# Patient Record
Sex: FEMALE | Race: WHITE | NOT HISPANIC OR LATINO | Employment: OTHER | URBAN - METROPOLITAN AREA
[De-identification: names, ages, dates, MRNs, and addresses within clinical notes are randomized per-mention and may not be internally consistent; named-entity substitution may affect disease eponyms.]

---

## 2021-06-16 PROBLEM — R55 SYNCOPE AND COLLAPSE: Status: ACTIVE | Noted: 2019-11-09

## 2023-04-08 ENCOUNTER — APPOINTMENT (OUTPATIENT)
Dept: CT IMAGING | Facility: CLINIC | Age: 78
End: 2023-04-08
Attending: EMERGENCY MEDICINE
Payer: COMMERCIAL

## 2023-04-08 ENCOUNTER — HOSPITAL ENCOUNTER (OUTPATIENT)
Facility: CLINIC | Age: 78
Setting detail: OBSERVATION
Discharge: HOME | End: 2023-04-09
Attending: EMERGENCY MEDICINE | Admitting: EMERGENCY MEDICINE
Payer: COMMERCIAL

## 2023-04-08 DIAGNOSIS — S09.90XA TRAUMATIC INJURY OF HEAD, INITIAL ENCOUNTER: ICD-10-CM

## 2023-04-08 DIAGNOSIS — R55 SYNCOPE, UNSPECIFIED SYNCOPE TYPE: ICD-10-CM

## 2023-04-08 DIAGNOSIS — K52.9 COLITIS: ICD-10-CM

## 2023-04-08 DIAGNOSIS — R10.32 ABDOMINAL PAIN, LEFT LOWER QUADRANT: ICD-10-CM

## 2023-04-08 LAB
ALBUMIN SERPL-MCNC: 3.6 G/DL (ref 3.5–5)
ALP SERPL-CCNC: 83 U/L (ref 45–120)
ALT SERPL W P-5'-P-CCNC: 18 U/L (ref 0–45)
ANION GAP SERPL CALCULATED.3IONS-SCNC: 10 MMOL/L (ref 5–18)
AST SERPL W P-5'-P-CCNC: 24 U/L (ref 0–40)
ATRIAL RATE - MUSE: 78 BPM
BILIRUB DIRECT SERPL-MCNC: 0.1 MG/DL
BILIRUB SERPL-MCNC: 0.5 MG/DL (ref 0–1)
BUN SERPL-MCNC: 16 MG/DL (ref 8–28)
CALCIUM SERPL-MCNC: 9.1 MG/DL (ref 8.5–10.5)
CHLORIDE BLD-SCNC: 102 MMOL/L (ref 98–107)
CO2 SERPL-SCNC: 24 MMOL/L (ref 22–31)
CREAT SERPL-MCNC: 0.83 MG/DL (ref 0.6–1.1)
DIASTOLIC BLOOD PRESSURE - MUSE: 98 MMHG
ERYTHROCYTE [DISTWIDTH] IN BLOOD BY AUTOMATED COUNT: 12.4 % (ref 10–15)
GFR SERPL CREATININE-BSD FRML MDRD: 71 ML/MIN/1.73M2
GLUCOSE BLD-MCNC: 113 MG/DL (ref 70–125)
HCT VFR BLD AUTO: 38.9 % (ref 35–47)
HGB BLD-MCNC: 13.3 G/DL (ref 11.7–15.7)
INTERPRETATION ECG - MUSE: NORMAL
MCH RBC QN AUTO: 31.8 PG (ref 26.5–33)
MCHC RBC AUTO-ENTMCNC: 34.2 G/DL (ref 31.5–36.5)
MCV RBC AUTO: 93 FL (ref 78–100)
P AXIS - MUSE: 59 DEGREES
PLATELET # BLD AUTO: 241 10E3/UL (ref 150–450)
POTASSIUM BLD-SCNC: 4.2 MMOL/L (ref 3.5–5)
PR INTERVAL - MUSE: 160 MS
PROT SERPL-MCNC: 7.8 G/DL (ref 6–8)
QRS DURATION - MUSE: 100 MS
QT - MUSE: 410 MS
QTC - MUSE: 467 MS
R AXIS - MUSE: -24 DEGREES
RBC # BLD AUTO: 4.18 10E6/UL (ref 3.8–5.2)
SODIUM SERPL-SCNC: 136 MMOL/L (ref 136–145)
SYSTOLIC BLOOD PRESSURE - MUSE: 209 MMHG
T AXIS - MUSE: 35 DEGREES
TROPONIN I SERPL-MCNC: 0.01 NG/ML (ref 0–0.29)
VENTRICULAR RATE- MUSE: 78 BPM
WBC # BLD AUTO: 10.7 10E3/UL (ref 4–11)

## 2023-04-08 PROCEDURE — 96360 HYDRATION IV INFUSION INIT: CPT | Mod: 59

## 2023-04-08 PROCEDURE — 99285 EMERGENCY DEPT VISIT HI MDM: CPT | Mod: 25

## 2023-04-08 PROCEDURE — 258N000003 HC RX IP 258 OP 636: Performed by: EMERGENCY MEDICINE

## 2023-04-08 PROCEDURE — 83880 ASSAY OF NATRIURETIC PEPTIDE: CPT | Performed by: INTERNAL MEDICINE

## 2023-04-08 PROCEDURE — 80053 COMPREHEN METABOLIC PANEL: CPT | Performed by: EMERGENCY MEDICINE

## 2023-04-08 PROCEDURE — 84484 ASSAY OF TROPONIN QUANT: CPT | Performed by: EMERGENCY MEDICINE

## 2023-04-08 PROCEDURE — 70450 CT HEAD/BRAIN W/O DYE: CPT

## 2023-04-08 PROCEDURE — 93005 ELECTROCARDIOGRAM TRACING: CPT | Performed by: EMERGENCY MEDICINE

## 2023-04-08 PROCEDURE — 36415 COLL VENOUS BLD VENIPUNCTURE: CPT | Performed by: EMERGENCY MEDICINE

## 2023-04-08 PROCEDURE — 96361 HYDRATE IV INFUSION ADD-ON: CPT

## 2023-04-08 PROCEDURE — 99238 HOSP IP/OBS DSCHRG MGMT 30/<: CPT | Performed by: EMERGENCY MEDICINE

## 2023-04-08 PROCEDURE — 83735 ASSAY OF MAGNESIUM: CPT | Performed by: INTERNAL MEDICINE

## 2023-04-08 PROCEDURE — 82248 BILIRUBIN DIRECT: CPT | Performed by: EMERGENCY MEDICINE

## 2023-04-08 PROCEDURE — 74177 CT ABD & PELVIS W/CONTRAST: CPT

## 2023-04-08 PROCEDURE — 85027 COMPLETE CBC AUTOMATED: CPT | Performed by: EMERGENCY MEDICINE

## 2023-04-08 PROCEDURE — 250N000011 HC RX IP 250 OP 636: Performed by: EMERGENCY MEDICINE

## 2023-04-08 PROCEDURE — 51702 INSERT TEMP BLADDER CATH: CPT

## 2023-04-08 RX ORDER — IOPAMIDOL 755 MG/ML
100 INJECTION, SOLUTION INTRAVASCULAR ONCE
Status: COMPLETED | OUTPATIENT
Start: 2023-04-08 | End: 2023-04-08

## 2023-04-08 RX ADMIN — SODIUM CHLORIDE 1000 ML: 9 INJECTION, SOLUTION INTRAVENOUS at 21:35

## 2023-04-08 RX ADMIN — IOPAMIDOL 100 ML: 755 INJECTION, SOLUTION INTRAVENOUS at 22:27

## 2023-04-08 ASSESSMENT — ACTIVITIES OF DAILY LIVING (ADL)
ADLS_ACUITY_SCORE: 35
ADLS_ACUITY_SCORE: 35

## 2023-04-09 VITALS
SYSTOLIC BLOOD PRESSURE: 131 MMHG | TEMPERATURE: 98 F | BODY MASS INDEX: 30.85 KG/M2 | OXYGEN SATURATION: 98 % | WEIGHT: 215.5 LBS | RESPIRATION RATE: 18 BRPM | DIASTOLIC BLOOD PRESSURE: 62 MMHG | HEIGHT: 70 IN | HEART RATE: 75 BPM

## 2023-04-09 PROBLEM — S09.90XA TRAUMATIC INJURY OF HEAD, INITIAL ENCOUNTER: Status: ACTIVE | Noted: 2023-04-09

## 2023-04-09 PROBLEM — K52.9 COLITIS: Status: ACTIVE | Noted: 2023-04-09

## 2023-04-09 PROBLEM — R55 SYNCOPE, UNSPECIFIED SYNCOPE TYPE: Status: ACTIVE | Noted: 2023-04-09

## 2023-04-09 PROBLEM — R10.32 ABDOMINAL PAIN, LEFT LOWER QUADRANT: Status: ACTIVE | Noted: 2023-04-09

## 2023-04-09 LAB
ALBUMIN SERPL-MCNC: 3.2 G/DL (ref 3.5–5)
ALBUMIN UR-MCNC: NEGATIVE MG/DL
ALP SERPL-CCNC: 72 U/L (ref 45–120)
ALT SERPL W P-5'-P-CCNC: 14 U/L (ref 0–45)
ANION GAP SERPL CALCULATED.3IONS-SCNC: 9 MMOL/L (ref 5–18)
APPEARANCE UR: CLEAR
AST SERPL W P-5'-P-CCNC: 21 U/L (ref 0–40)
BASOPHILS # BLD AUTO: 0.1 10E3/UL (ref 0–0.2)
BASOPHILS NFR BLD AUTO: 1 %
BILIRUB SERPL-MCNC: 0.6 MG/DL (ref 0–1)
BILIRUB UR QL STRIP: NEGATIVE
BNP SERPL-MCNC: 14 PG/ML (ref 0–151)
BUN SERPL-MCNC: 14 MG/DL (ref 8–28)
CALCIUM SERPL-MCNC: 8.5 MG/DL (ref 8.5–10.5)
CHLORIDE BLD-SCNC: 108 MMOL/L (ref 98–107)
CO2 SERPL-SCNC: 22 MMOL/L (ref 22–31)
COLOR UR AUTO: COLORLESS
CREAT SERPL-MCNC: 0.75 MG/DL (ref 0.6–1.1)
EOSINOPHIL # BLD AUTO: 0.1 10E3/UL (ref 0–0.7)
EOSINOPHIL NFR BLD AUTO: 1 %
ERYTHROCYTE [DISTWIDTH] IN BLOOD BY AUTOMATED COUNT: 12.5 % (ref 10–15)
GFR SERPL CREATININE-BSD FRML MDRD: 81 ML/MIN/1.73M2
GLUCOSE BLD-MCNC: 109 MG/DL (ref 70–125)
GLUCOSE UR STRIP-MCNC: NEGATIVE MG/DL
HCT VFR BLD AUTO: 36.9 % (ref 35–47)
HGB BLD-MCNC: 12.5 G/DL (ref 11.7–15.7)
HGB UR QL STRIP: NEGATIVE
IMM GRANULOCYTES # BLD: 0 10E3/UL
IMM GRANULOCYTES NFR BLD: 0 %
KETONES UR STRIP-MCNC: ABNORMAL MG/DL
LEUKOCYTE ESTERASE UR QL STRIP: NEGATIVE
LYMPHOCYTES # BLD AUTO: 1.6 10E3/UL (ref 0.8–5.3)
LYMPHOCYTES NFR BLD AUTO: 22 %
MAGNESIUM SERPL-MCNC: 2.1 MG/DL (ref 1.8–2.6)
MAGNESIUM SERPL-MCNC: 2.1 MG/DL (ref 1.8–2.6)
MCH RBC QN AUTO: 31.7 PG (ref 26.5–33)
MCHC RBC AUTO-ENTMCNC: 33.9 G/DL (ref 31.5–36.5)
MCV RBC AUTO: 94 FL (ref 78–100)
MONOCYTES # BLD AUTO: 0.6 10E3/UL (ref 0–1.3)
MONOCYTES NFR BLD AUTO: 9 %
NEUTROPHILS # BLD AUTO: 4.5 10E3/UL (ref 1.6–8.3)
NEUTROPHILS NFR BLD AUTO: 67 %
NITRATE UR QL: NEGATIVE
NRBC # BLD AUTO: 0 10E3/UL
NRBC BLD AUTO-RTO: 0 /100
PH UR STRIP: 8 [PH] (ref 5–7)
PLATELET # BLD AUTO: 224 10E3/UL (ref 150–450)
POTASSIUM BLD-SCNC: 3.8 MMOL/L (ref 3.5–5)
PROCALCITONIN SERPL-MCNC: 0.03 NG/ML (ref 0–0.49)
PROT SERPL-MCNC: 6.8 G/DL (ref 6–8)
RBC # BLD AUTO: 3.94 10E6/UL (ref 3.8–5.2)
RBC URINE: 0 /HPF
SODIUM SERPL-SCNC: 139 MMOL/L (ref 136–145)
SP GR UR STRIP: 1.02 (ref 1–1.03)
UROBILINOGEN UR STRIP-MCNC: <2 MG/DL
WBC # BLD AUTO: 6.9 10E3/UL (ref 4–11)
WBC URINE: 3 /HPF

## 2023-04-09 PROCEDURE — G0378 HOSPITAL OBSERVATION PER HR: HCPCS

## 2023-04-09 PROCEDURE — 99223 1ST HOSP IP/OBS HIGH 75: CPT | Performed by: INTERNAL MEDICINE

## 2023-04-09 PROCEDURE — 250N000013 HC RX MED GY IP 250 OP 250 PS 637: Performed by: INTERNAL MEDICINE

## 2023-04-09 PROCEDURE — 80053 COMPREHEN METABOLIC PANEL: CPT | Performed by: INTERNAL MEDICINE

## 2023-04-09 PROCEDURE — 258N000003 HC RX IP 258 OP 636: Performed by: INTERNAL MEDICINE

## 2023-04-09 PROCEDURE — 83735 ASSAY OF MAGNESIUM: CPT | Performed by: INTERNAL MEDICINE

## 2023-04-09 PROCEDURE — 36415 COLL VENOUS BLD VENIPUNCTURE: CPT | Performed by: INTERNAL MEDICINE

## 2023-04-09 PROCEDURE — 81001 URINALYSIS AUTO W/SCOPE: CPT | Performed by: EMERGENCY MEDICINE

## 2023-04-09 PROCEDURE — 85025 COMPLETE CBC W/AUTO DIFF WBC: CPT | Performed by: INTERNAL MEDICINE

## 2023-04-09 PROCEDURE — 84145 PROCALCITONIN (PCT): CPT | Performed by: INTERNAL MEDICINE

## 2023-04-09 PROCEDURE — 96361 HYDRATE IV INFUSION ADD-ON: CPT

## 2023-04-09 RX ORDER — ESCITALOPRAM OXALATE 10 MG/1
10 TABLET ORAL DAILY
COMMUNITY

## 2023-04-09 RX ORDER — ACETAMINOPHEN 325 MG/1
650 TABLET ORAL EVERY 4 HOURS PRN
Status: DISCONTINUED | OUTPATIENT
Start: 2023-04-09 | End: 2023-04-09 | Stop reason: HOSPADM

## 2023-04-09 RX ORDER — ONDANSETRON 2 MG/ML
4 INJECTION INTRAMUSCULAR; INTRAVENOUS EVERY 6 HOURS PRN
Status: DISCONTINUED | OUTPATIENT
Start: 2023-04-09 | End: 2023-04-09 | Stop reason: HOSPADM

## 2023-04-09 RX ORDER — POTASSIUM CHLORIDE 1500 MG/1
20 TABLET, EXTENDED RELEASE ORAL ONCE
Status: COMPLETED | OUTPATIENT
Start: 2023-04-09 | End: 2023-04-09

## 2023-04-09 RX ORDER — ACETAMINOPHEN 650 MG/1
650 SUPPOSITORY RECTAL EVERY 6 HOURS PRN
Status: DISCONTINUED | OUTPATIENT
Start: 2023-04-09 | End: 2023-04-09 | Stop reason: HOSPADM

## 2023-04-09 RX ORDER — ONDANSETRON 4 MG/1
4 TABLET, ORALLY DISINTEGRATING ORAL EVERY 6 HOURS PRN
Status: DISCONTINUED | OUTPATIENT
Start: 2023-04-09 | End: 2023-04-09 | Stop reason: HOSPADM

## 2023-04-09 RX ORDER — SODIUM CHLORIDE 9 MG/ML
INJECTION, SOLUTION INTRAVENOUS CONTINUOUS
Status: DISCONTINUED | OUTPATIENT
Start: 2023-04-09 | End: 2023-04-09 | Stop reason: HOSPADM

## 2023-04-09 RX ADMIN — POTASSIUM CHLORIDE 20 MEQ: 1500 TABLET, EXTENDED RELEASE ORAL at 06:34

## 2023-04-09 RX ADMIN — SODIUM CHLORIDE: 9 INJECTION, SOLUTION INTRAVENOUS at 02:53

## 2023-04-09 ASSESSMENT — ACTIVITIES OF DAILY LIVING (ADL)
ADLS_ACUITY_SCORE: 33
ADLS_ACUITY_SCORE: 35
DEPENDENT_IADLS:: INDEPENDENT
ADLS_ACUITY_SCORE: 33
ADLS_ACUITY_SCORE: 31

## 2023-04-09 NOTE — ED TRIAGE NOTES
Pt presents to the ED with c/o syncopal episode that occurred around 7pm. Pt was using on the toilet, stood up and took a few steps, had a syncopal episode. Pt hit left side of head. Pt denies any pain at this time. Not on blood thinners. Pt then had an episode of N/V/D while on the floor.      Triage Assessment     Row Name 04/08/23 2030       Triage Assessment (Adult)    Airway WDL WDL       Respiratory WDL    Respiratory WDL WDL       Skin Circulation/Temperature WDL    Skin Circulation/Temperature WDL WDL       Cardiac WDL    Cardiac WDL WDL       Peripheral/Neurovascular WDL    Peripheral Neurovascular WDL WDL       Cognitive/Neuro/Behavioral WDL    Cognitive/Neuro/Behavioral WDL WDL

## 2023-04-09 NOTE — PROGRESS NOTES
PRIMARY DIAGNOSIS: SYNCOPE/TIA  OUTPATIENT/OBSERVATION GOALS TO BE MET BEFORE DISCHARGE:  1. Orthostatic performed: Yes    2. Diagnostic testing complete & at baseline neurologic testing: Yes, baseline neuro check intact    3. Cleared by consultants (if involved): N/A    4. Interpretation of cardiac rhythm per telemetry tech: NSR    5. Tolerating adequate PO diet and medications: Yes    6. Return to near baseline physical activity or neurologic status: Yes    Discharge Planner Nurse   Safe discharge environment identified: Yes  Barriers to discharge: Yes       Entered by: Jaclyn Cary RN 04/09/2023 4:30 AM   Patient denies pain.  Neuros intact, pt A&O  VSS.   Purewick in place overnight.  Tele: NSR.    Please review provider order for any additional goals.   Nurse to notify provider when observation goals have been met and patient is ready for discharge.

## 2023-04-09 NOTE — H&P
[facility] MEDICINE ADMISSION HISTORY AND PHYSICAL       Assessment & Plan      1. Syncope  -- Based on DYLAN rule or Polish Syncope Risk Score -- she is very low risk.     From sitting to standing - R/O vasovagal/orthostatic. Has left forehead contusion. Neuro intact GCS 15     Head CT is negative for acute findings. Not justin, not hypoxic, no Qs on EKG and no anemia    - Neuro checks   - tele  - advised to work-up for syncope if there are cardiac concerns - she said, she is physically active/ehalthy   - orthostatic check  - CBC/CMP in AM      2. Diarrhea, after visiting [place] and had pizza --- CT showed colitis --- in the absence of other risks -- appears to be viral gastroenteritis. No antibiotic exposure  - stool studies such as C diff     3. HTN and HL  - PTA meds       VTE prophylaxis: SCDs,  if staying more than 24 hours, consider adding subcutaneous lovenox or heparin.  Diet:  cardiac  Code Status: Full   COVID vaccination: yes  Barriers to discharge: admitting clinical condition  Discharge Disposition and goals:  Unable to determine at this point, pending clinical progress and response to treatment. Patient may need transfer to SNF or ACR if unsafe to go home and needed treatment inappropriate at home setting OR may need home health care evaluation if care can be delivered at home settings. Consider referral to care manager/    PPE - I was wearing PPE when I met the patient including but not limited to - N95 mask, Gloves, and/or Safety glasses.      Care plan was created based on available information and patient's condition at the time of encounter. This was discussed with the patient and/or family members using layman's terms, including counseling/education and they have agreed to proceed. I recommend to revise care plan and to review history if there is change in condition and/or new clinical information that is not available during my encounter. At the end of  night shift, this case will be presented to the AM Hospitalist.       75 minutes spent by me on the date of service doing chart review, history, exam, diagnostic test results interpretation, documentation & further activities per the note.      Matthias Loredo MD, MPH, FACP, [facility]  Internal Medicine - Hospitalist        Chief Complaint Syncope      HISTORY     - I met her in ED-11. She is here because of syncope. She is also had diarrhea  - Woke up OK today, went to [facility] with , ate some pizza and went home.   - Had an urge to move her bowels, while seated - felt cold and shaky - and she stood up and passed out. She did hit her head and has left forehead contusion. No neck pain. No fever. No arm weakness.  - She said, she feels weak, and her  told her she had diarrhea. No blood.  - She has LLQ pain and vomited. She was concerned it maybe diverticulitis  - She had history of passing out, 2 years ago - also happened with GI loss.    - In the ED, EKG showed SR and no prolonged QTc. She had CT abdomen that showed - Colitis. Inflammatory, infectious or ischemic etiology not differentiated.    - Blood work unremarkable. Feels weak, felt not safe to go home.     - ROS --- No headache. No dizziness.  No CP or SOB. No palpitations. No urinary symptoms. No bleeding symptoms. No weight loss. Rest of 12 point ROS was reviewed and negative.       Past Medical History     History reviewed. No pertinent past medical history.      Surgical History     Past Surgical History:   Procedure Laterality Date     ABDOMEN SURGERY       BIOPSY SKIN (LOCATION)      face -     HYSTERECTOMY       TONSILLECTOMY          Family History      History reviewed. No pertinent family history.      Social History      .  Social History     Socioeconomic History     Marital status:      Spouse name: Not on file     Number of children: Not on file     Years of education: Not on file     Highest education level: Not on file    Occupational History     Not on file   Tobacco Use     Smoking status: Former     Packs/day: 0.00     Types: Cigarettes     Quit date: 1979     Years since quittin.4     Smokeless tobacco: Never   Vaping Use     Vaping status: Not on file   Substance and Sexual Activity     Alcohol use: Yes     Alcohol/week: 1.0 standard drink of alcohol     Comment: Alcoholic Drinks/day: couple times a month     Drug use: Never     Sexual activity: Not Currently   Other Topics Concern     Not on file   Social History Narrative     Not on file     Social Determinants of Health     Financial Resource Strain: Not on file   Food Insecurity: Not on file   Transportation Needs: Not on file   Physical Activity: Not on file   Stress: Not on file   Social Connections: Not on file   Intimate Partner Violence: Not on file   Housing Stability: Not on file          Allergies        Allergies   Allergen Reactions     Lactose Unknown     Intolerant     Penicillins Rash         Prior to Admission Medications      No current facility-administered medications on file prior to encounter.  calcium-vitamin D 500 mg(1,250mg) -200 unit per tablet, [CALCIUM-VITAMIN D 500 MG(1,250MG) -200 UNIT PER TABLET] Take 2 tablets by mouth daily.  gemfibrozil (LOPID) 600 MG tablet, [GEMFIBROZIL (LOPID) 600 MG TABLET] Take 600 mg by mouth 2 (two) times a day before meals.  levothyroxine (SYNTHROID, LEVOTHROID) 125 MCG tablet, [LEVOTHYROXINE (SYNTHROID, LEVOTHROID) 125 MCG TABLET] Take 125 mcg by mouth Daily at 6:00 am.  lisinopril (PRINIVIL,ZESTRIL) 5 MG tablet, [LISINOPRIL (PRINIVIL,ZESTRIL) 5 MG TABLET] Take 5 mg by mouth daily.  polyethylene glycol (MIRALAX) 17 gram packet, [POLYETHYLENE GLYCOL (MIRALAX) 17 GRAM PACKET] Take 17 g by mouth every evening.            Review of Systems     A 12 point comprehensive review of systems was negative except as noted above in HPI.    PHYSICAL EXAMINATION       Vitals      Vitals: BP (!) 168/78   Pulse 82   Temp  98.5  F (36.9  C) (Oral)   Resp 20   Wt 88.9 kg (196 lb)   SpO2 99%   BMI 28.33 kg/m    BMI= Body mass index is 28.33 kg/m .      Examination     General Appearance:  Alert, cooperative, no distress  Head:    Normocephalic, without obvious abnormality, atraumatic  EENT:  PERRL, conjunctiva/corneas clear, EOM's intact.   Neck:   Supple, symmetrical, trachea midline, no adenopathy; no NVE  Back:  Symmetric, no curvature, no CVA tenderness  Chest/Lungs: Clear to auscultation bilaterally, respirations unlabored, No tenderness or deformity. No abdominal breathing or use of accessory muscles.   Heart:    Regular rate and rhythm, S1 and S2 normal, no murmur, rub   or gallop  Abdomen: Soft, non-tender, bowel sounds active all four quadrants, not peritoneal on palpation. Not distended  Extremities:  Extremities normal, atraumatic, no swelling   Skin:  Skin color, texture, turgor normal, no rashes or lesion  Neurologic:  Awake and alert,  No lateralizing or localizing signs       Pertinent Lab

## 2023-04-09 NOTE — PHARMACY-ADMISSION MEDICATION HISTORY
Pharmacist Admission Medication History    Admission medication history is complete. The information provided in this note is only as accurate as the sources available at the time of the update.    Medication reconciliation/reorder completed by provider prior to medication history? No    Information Source(s): Patient and CareEverywhere/SureScripts via in-person    Pertinent Information: none    Changes made to PTA medication list:    Added: esitalopram    Deleted: gemfib    Changed: None    Medication Affordability:       Allergies reviewed with patient and updates made in EHR: yes    Medication History Completed By: Noel Godwin RPH 4/9/2023 7:53 AM    PTA Med List   Medication Sig Last Dose     escitalopram (LEXAPRO) 10 MG tablet Take 10 mg by mouth daily 4/8/2023     levothyroxine (SYNTHROID, LEVOTHROID) 125 MCG tablet [LEVOTHYROXINE (SYNTHROID, LEVOTHROID) 125 MCG TABLET] Take 125 mcg by mouth Daily at 6:00 am. 4/8/2023     lisinopril (PRINIVIL,ZESTRIL) 5 MG tablet Take 5 mg by mouth every evening 4/8/2023

## 2023-04-09 NOTE — PROGRESS NOTES
"PRIMARY DIAGNOSIS: \"GENERIC\" NURSING  OUTPATIENT/OBSERVATION GOALS TO BE MET BEFORE DISCHARGE:  1. ADLs back to baseline: Yes    2. Activity and level of assistance: Ambulating independently.    3. Pain status: Pain free.    4. Return to near baseline physical activity: Yes     Discharge Planner Nurse   Safe discharge environment identified: Yes  Barriers to discharge: No, pt will discharge home with SO.       Entered by: Funmi Marin RN 04/09/2023 12:34 PM     Pt ambulating independently in room. VSS.  "

## 2023-04-09 NOTE — DISCHARGE SUMMARY
[facility] MEDICINE  DISCHARGE SUMMARY     Primary Care Physician: Kalpesh Almaguer  Admission Date: 4/8/2023   Discharge Provider: Saniya Milton MD Discharge Date: 4/9/2023   Diet:   Active Diet and Nourishment Order   Procedures     Combination Diet Low Saturated Fat Na <2400mg Diet, No Caffeine Diet     Diet       Code Status: Full Code   Activity: as tolerated        Condition at Discharge: Stable     REASON FOR PRESENTATION(See Admission Note for Details)     syncope    PRINCIPAL & ACTIVE DISCHARGE DIAGNOSES       1.  Vasovagal syncope  2.  Vomiting, diarrhea due to colitis  3.  HTN  4.  Closed head injury due to syncope  5.  Acute colitis due to viral infection        RECOMMENDATIONS TO OUTPATIENT PROVIDER FOR F/U VISIT     Follow-up Appointments     Follow-up and recommended labs and tests       Regular follow up           DISPOSITION     Home    SUMMARY OF HOSPITAL COURSE:      77 year old female into [facility] on 4/8/2023 after presenting to the ER with vomiting, diarrhea,  Syncope and a mild head injury. Pt reports going to [facility] [employer].  A few hours later after arriving  Home she felt an onset of diarrhea.  Pt states she went to the bathroom; had a small bm; got up  Then passed out. She hit the left side of her head after she passed out. On arrival she had some mild  LLQ abdominal pain without radiation.     ER diagnostics showed normal blood work including white count, creatinine, electrolytes, urinalysis.  CT head without contrast is negative.  CT abdomen/pelvis with contrast is positive for mild colitis in the  Descending colon though not diverticulitis. She has a known history of diverticulosis.      Pt was admitted for clinical support.  She was give ivf.  On my interview at 1030 4/9/2023 she felt  Clinically improved. The pain was gone.  She ate a bagel, she was drinking fluids. She had no  Pain.  She wondered about discharge. She had no  headache. She did not feel great though felt  She could be discharged to convalesce at her home.    She had a mild GI illness that caused her to have a vasovagal syncopal event. It will resolve without  Additional clinical support.  She is not dehydrated. She denies having chest related symptoms.  She has never had a syncopal event in the past. She ambulated without  Symptoms. She has no murmurs.    I feel she can be discharged to outpatient follow up. Pt is in agreement.     Discharge Medications with Med changes:     Current Discharge Medication List      CONTINUE these medications which have NOT CHANGED    Details   escitalopram (LEXAPRO) 10 MG tablet Take 10 mg by mouth daily      levothyroxine (SYNTHROID, LEVOTHROID) 125 MCG tablet [LEVOTHYROXINE (SYNTHROID, LEVOTHROID) 125 MCG TABLET] Take 125 mcg by mouth Daily at 6:00 am.      lisinopril (PRINIVIL,ZESTRIL) 5 MG tablet Take 5 mg by mouth every evening                   Consults       CARE MANAGEMENT / SOCIAL WORK IP CONSULT    Immunizations given this encounter     Most Recent Immunizations   Administered Date(s) Administered     COVID-19 Vaccine 12+ (Pfizer 2022) 06/03/2022     COVID-19 Vaccine Bivalent Booster 12+ (Pfizer) 09/26/2022     Flu, Unspecified 10/15/2019     Pneumo Conj 13-V (2010&after) 03/18/2015     Pneumococcal 23 valent 12/05/2008     TDAP (Adacel,Boostrix) 01/26/2016     Zoster vaccine, live 08/01/2012            SIGNIFICANT IMAGING FINDINGS     Results for orders placed or performed during the hospital encounter of 04/08/23   Head CT w/o contrast    Impression    IMPRESSION:  1.  No CT evidence for acute intracranial process.  2.  Brain atrophy and presumed chronic microvascular ischemic changes as above.  3.  Enlarged, partially empty sella turcica. This finding is nonspecific, but can be seen in the setting of idiopathic intracranial hypertension.   CT Abdomen Pelvis w Contrast    Impression    IMPRESSION:   1.  Colitis. Inflammatory,  infectious or ischemic etiology not differentiated.  2.  No bowel obstruction or abscess.  3.  Cholelithiasis and mild extra hepatic biliary prominence.  4.  Diverticulosis.       SIGNIFICANT LABORATORY FINDINGS     Most Recent 3 BMP's:Recent Labs   Lab Test 04/09/23  0410 04/08/23  2134 11/10/19  0604    136 140   POTASSIUM 3.8 4.2 4.0   CHLORIDE 108* 102 109*   CO2 22 24 25   BUN 14 16 13   CR 0.75 0.83 0.64   ANIONGAP 9 10 6   VALENTINE 8.5 9.1 8.9    113 96           Discharge Orders        Reason for your hospital stay    Vasovagal syncope     Follow-up and recommended labs and tests     Regular follow up     Activity    As tolerated     Diet    Regular follow up       Examination   Physical Exam   Temp:  [97.8  F (36.6  C)-98.6  F (37  C)] 98  F (36.7  C)  Pulse:  [72-82] 75  Resp:  [16-20] 18  BP: (131-209)/(62-98) 131/62  SpO2:  [94 %-99 %] 98 %  Wt Readings from Last 1 Encounters:   04/09/23 97.8 kg (215 lb 8 oz)       General Appearance: Alert, oriented, no distress  Respiratory: clear bilaterally  Cardiovascular: regular  GI: soft, NDNT, +BS  Skin: no rashes        Please see EMR for more detailed significant labs, imaging, consultant notes etc.    ISaniya MD, personally saw the patient today and spent less than or equal to 30 minutes discharging this patient.    Saniya Milton MD  [facility]    CC:Clinic, [facility]

## 2023-04-09 NOTE — ED PROVIDER NOTES
"EMERGENCY DEPARTMENT ENCOUNTER      NAME: Lennie Holden  YOB: 1945  MRN: [id number]    FINAL IMPRESSION  1. Syncope, unspecified syncope type    2. Colitis    3. Traumatic injury of head, initial encounter    4. Abdominal pain, left lower quadrant        MEDICAL DECISION MAKING   Pertinent Labs & Imaging studies reviewed. (See chart for details)    Lennie Holden is a 77 year old female who presents for evaluation after a fall/syncope with head trauma as well as abdominal pain.  Patient reports that she was sitting on the toilet to have a bowel movement around 7 PM this afternoon when suddenly felt lightheaded, cold, weak and shaky.  She attempted to stand up and get back to bed but was only able to walk a few feet before she apparently had a syncopal episode and passed out.  Her  found her on the floor a short time after and patient recalls having large volume emesis and diarrhea while laying there.  She denies straining while on the toilet and reports she was feeling well earlier in the day today.  Patient does report that she has a history of colitis and diverticulitis and since the episode this evening, has developed some \"soreness\" in her left lower quadrant which she states feels similar to what she had when she was diagnosed with colitis in the past.  She reports she takes miralax daily and has for years. She had an attempted colonoscopy in the past but they were apparently not able to complete it due to a \"blockage.\"  Etiology of prior episode of colitis was apparently never identified.  At time of my initial evaluation, patient reports feeling generally weak and has some mild discomfort in her left lower quadrant and on her forehead but otherwise has no new complaints.  She specifically denies pain in her neck, back, extremities, chest.  She has not had any recent signs or symptoms of infection or illness and reports feeling well prior to the fall this evening.  She is not on a blood " thinner.  Remainder of history and exam, as below.     Outside records reviewed.  Patient had an incomplete colonoscopy most recently in 2017 at which time it was noted that she has adhesions in the sigmoid colon.  She was advised to proceed with CT colonography but was also noted to have diverticulosis.    I considered a broad differential including but not limited vasovagal episode, arrhythmia or other ECG abnormality, ischemia, anemia, electrolyte derangement, seizure. No complaints of dyspnea or pleuritic CP, tachypnea, or hypoxia to suggest PE. No chest or back pain to suggest AAA/aortic dissection. No tongue biting, incontinence, post-ictal state or other s/s to suggest seizure.  With regards to abdominal pain, considered diverticulitis, diverticulosis, colitis, IBS, IBD, gastroenteritis, electrolyte derangement, acute kidney injury discussed options for work-up and management with the patient.  We have agreed on plan for.  CT of head, CT abdomen/pelvis, EKG, labs, IV fluids.  Patient declined offer for analgesic/antiemetic but will update me if she changes her mind.    CT head showed an empty sella but no evidence of bleed or other acute traumatic injury.  CT abdomen/pelvis revealed evidence of colitis but unclear if this is infectious, inflammatory, or ischemic.  I have low suspicion for ischemia given relatively benign exam.  We will plan to add on stool studies.  Laboratory work-up was unremarkable.  No acute anemia, leukocytosis, acidosis, acute kidney injury, coagulopathy.  Troponin negative and EKG without evidence of ischemic changes or arrhythmia.    I rechecked the patient multiple times.  She remained comfortable and felt reassured by results of work-up.  I had a relatively long discussion with she and her  on options for ongoing work-up and management.  Given evidence of colitis, generalized weakness, and episode prior to arrival, both patient and  would favor her staying in the  hospital overnight at least for observation and potentially, GI consult.    I discussed the case with [facility] medicine team who agreed to facilitate admission.  No acute events under my care.        Medical Decision Making    History:    Supplemental history from: Family Member/Significant Other    External Record(s) reviewed: Documented in chart, if applicable.    Work Up:    Chart documentation includes differential considered and any EKGs or imaging independently interpreted by provider, where specified.    In additional to work up documented, I considered the following work up: Documented in chart, if applicable.    External consultation:    Discussion of management with another provider: Documented in chart, if applicable    Complicating factors:    Care impacted by chronic illness: Hypertension    Care affected by social determinants of health: N/A    Disposition considerations: Admit.          ED COURSE  9:04 PM I met the patient and performed my initial interview and exam.   10:45 PM I rechecked the patient. She is feeling weak but otherwise ok. Will plan for admission.  12:58 AM Discussed the case with Dr. Loredo of [facility] medicine team.       MEDICATIONS GIVEN IN THE ED  Medications   0.9% sodium chloride BOLUS (1,000 mLs Intravenous $New Bag 4/8/23 2135)   iopamidol (ISOVUE-370) solution 100 mL (0 mLs Intravenous Not Given 4/8/23 2225)   iopamidol (ISOVUE-370) solution 100 mL (100 mLs Intravenous $Given 4/8/23 2227)       NEW PRESCRIPTIONS STARTED AT TODAY'S VISIT  New Prescriptions    No medications on file          =================================================================    Chief Complaint   Patient presents with     Syncope     Head Injury     Nausea & Vomiting         HPI:    Patient information was obtained from: patient and her     Use of : N/A     Lennie Holden is a 77 year old female who presents to the ED for evaluation of syncope, nausea, vomiting, diarrhea, and  "abdominal pain.    The patient reports she was on the toilet having a normal BM at ~1900 this evening when she suddenly felt lightheaded, cold, weak, and shaky. Patient denies straining prior to this. She denies any associated chest pain or shortness of breath. Patient tried to stand up and walk a few feet to her bed, but proceeded to have a syncopal episode in the hallway. Her , who was at home at the time, reports the patient fell on the carpeted floor and did hit her head. He thinks she hit the left side of her head on the door frame. He says that after the patient came to, she proceeded to have vomiting and diarrhea. The patient reports feeling completely fine prior to this episode. She denies any nausea or abdominal pain earlier in the day. Since this incident, patient has noticed some \"soreness\" in the left lower quadrant of her abdomen. She notes that she had colitis a few years ago and this feels similar to the pain she had then. The patient otherwise denies any neck pain, back pain, chest pain, or extremity pain since her episode this evening. The patient denies being on any blood thinners. Patient denies additional medical concerns or complaints at this time.           RELEVANT HISTORY, MEDICATIONS, & ALLERGIES   Past medical history, surgical history, family history, medications, and allergies reviewed and pertinent noted in HPI.    REVIEW OF SYSTEMS:  A complete review of systems was performed with pertinent positives and negatives noted in the HPI. All other systems negative.     PHYSICAL EXAM:    Vitals: BP (!) 168/78   Pulse 82   Temp 98.5  F (36.9  C) (Oral)   Resp 20   Wt 88.9 kg (196 lb)   SpO2 99%   BMI 28.33 kg/m     General: Alert and interactive, comfortable appearing.  HENT: Ecchymosis and contusion to left forehead with mild associated tenderness to palpation.  No other external signs of trauma or tenderness palpation of scalp or facial bones.  No malocclusion.  No epistaxis.. " Full AROM of neck with no midline tenderness palpation. Conjunctiva clear.   Cardiovascular: Regular rate and rhythm.   Chest/Pulmonary: Normal work of breathing. Speaking in complete sentences. Lungs CTAB. No chest wall tenderness or deformities.  Back: No midline tenderness palpation.  Abdomen: Soft, nondistended.  Mild tenderness palpation left lower quadrant without guarding or rebound.  Extremities: Normal AROM of all major joints.  No tenderness palpation of long bones or major joints.  No external signs of trauma.  Skin: Warm and dry. Normal skin color.   Neuro: Speech clear. CNs grossly intact. Moves all extremities spontaneously.   Psych: Normal affect/mood, cooperative, memory appropriate.    LAB  Labs Ordered and Resulted from Time of ED Arrival to Time of ED Departure   CBC WITH PLATELETS - Normal       Result Value    WBC Count 10.7      RBC Count 4.18      Hemoglobin 13.3      Hematocrit 38.9      MCV 93      MCH 31.8      MCHC 34.2      RDW 12.4      Platelet Count 241     BASIC METABOLIC PANEL - Normal    Sodium 136      Potassium 4.2      Chloride 102      Carbon Dioxide (CO2) 24      Anion Gap 10      Urea Nitrogen 16      Creatinine 0.83      Calcium 9.1      Glucose 113      GFR Estimate 71     TROPONIN I - Normal    Troponin I 0.01     HEPATIC FUNCTION PANEL - Normal    Bilirubin Total 0.5      Bilirubin Direct 0.1      Protein Total 7.8      Albumin 3.6      Alkaline Phosphatase 83      AST 24      ALT 18     ROUTINE UA WITH MICROSCOPIC REFLEX TO CULTURE       RADIOLOGY  CT Abdomen Pelvis w Contrast   Final Result   IMPRESSION:    1.  Colitis. Inflammatory, infectious or ischemic etiology not differentiated.   2.  No bowel obstruction or abscess.   3.  Cholelithiasis and mild extra hepatic biliary prominence.   4.  Diverticulosis.      Head CT w/o contrast   Preliminary Result   IMPRESSION:   1.  No CT evidence for acute intracranial process.   2.  Brain atrophy and presumed chronic  microvascular ischemic changes as above.   3.  Enlarged, partially empty sella turcica. This finding is nonspecific, but can be seen in the setting of idiopathic intracranial hypertension.          EKG  Performed at: 20:39 4/8/23.  Impression: Sinus rhythm. Normal ECG.  No WPW, Brugada, HOCM, ARVD.  No ischemic changes.  Rate: 78 BPM.  Rhythm: Sinus.  QRS Interval: 100 ms.  QTc Interval: 467 ms.  Comparison: Compared with ECG from 11/9/2019, no change.    All laboratory and imaging results and EKG's were personally reviewed and interpreted by myself prior to disposition decision.         I, Maria Elena Alfonso, am serving as a scribe to document services personally performed by Dr. Ana Galvan based on my observation and the provider's statements to me. I, Ana Galvan MD attest that Maria Elena Alfonso is acting in a scribe capacity, has observed my performance of the services and has documented them in accordance with my direction.    Ana Galvan M.D.  Emergency Medicine  [facility] EMERGENCY ROOM  [address] [facility] [id number] [phone]  Dept: [phone]     Ana Galvan MD  04/09/23 0058

## 2023-04-09 NOTE — CONSULTS
Care Management Initial Consult    General Information  Assessment completed with: Patient,    Type of CM/SW Visit: Initial Assessment    Primary Care Provider verified and updated as needed: Yes   Readmission within the last 30 days:     Reason for Consult: discharge planning  Advance Care Planning: Advance Care Planning Reviewed: verified with patient        Communication Assessment  Patient's communication style: spoken language (English or Bilingual)    Hearing Difficulty or Deaf: no      Cognitive  Cognitive/Neuro/Behavioral: WDL                      Living Environment:   People in home: spouse  Maynor  Current living Arrangements: house      Able to return to prior arrangements: yes     Family/Social Support:  Care provided by: self  Provides care for: no one       Maynor       Description of Support System: Supportive, Involved       Current Resources:   Patient receiving home care services: No     Community Resources: None  Equipment currently used at home:    Supplies currently used at home: None    Employment/Financial:  Employment Status:          Financial Concerns: No concerns identified   Referral to Financial Worker: No     Lifestyle & Psychosocial Needs:  Social Determinants of Health     Tobacco Use: Medium Risk (4/8/2023)    Patient History      Smoking Tobacco Use: Former      Smokeless Tobacco Use: Never      Passive Exposure: Not on file   Alcohol Use: Not on file   Financial Resource Strain: Not on file   Food Insecurity: Not on file   Transportation Needs: Not on file   Physical Activity: Not on file   Stress: Not on file   Social Connections: Not on file   Intimate Partner Violence: Not on file   Depression: Not on file   Housing Stability: Not on file     Functional Status:  Prior to admission patient needed assistance:   Dependent ADLs:: Independent  Dependent IADLs:: Independent     Mental Health Status:  Mental Health Status: No Current Concerns       Chemical Dependency  Status:  Chemical Dependency Status: No Current Concerns           Values/Beliefs:  Spiritual, Cultural Beliefs, Bahai Practices, Values that affect care:               Additional Information:  Met with patient to review role of care management, progression of care and possible need for services at discharge, including OP services, home care, or skilled nursing care.     Pt is admitted as Observation status.  MOON paperwork provided.    Pt states she resides with her spouse and is independent with all ADLs/IADLs.  She ambulates without any assistive devices, manages own medications and cares, and drives.     Plan to discharge home today with OP f/u with PCP and GI. Spouse will provide transportation home.     Sabrina Rush RN

## 2024-01-24 ENCOUNTER — APPOINTMENT (OUTPATIENT)
Dept: CT IMAGING | Facility: CLINIC | Age: 79
End: 2024-01-24
Payer: COMMERCIAL

## 2024-01-24 ENCOUNTER — HOSPITAL ENCOUNTER (EMERGENCY)
Facility: CLINIC | Age: 79
Discharge: HOME | End: 2024-01-24
Payer: COMMERCIAL

## 2024-01-24 VITALS
WEIGHT: 210 LBS | OXYGEN SATURATION: 99 % | DIASTOLIC BLOOD PRESSURE: 89 MMHG | RESPIRATION RATE: 16 BRPM | SYSTOLIC BLOOD PRESSURE: 153 MMHG | TEMPERATURE: 97.7 F | HEART RATE: 71 BPM | BODY MASS INDEX: 31.1 KG/M2 | HEIGHT: 69 IN

## 2024-01-24 DIAGNOSIS — K52.9 COLITIS: ICD-10-CM

## 2024-01-24 DIAGNOSIS — S00.03XA SCALP HEMATOMA, INITIAL ENCOUNTER: ICD-10-CM

## 2024-01-24 DIAGNOSIS — N39.0 UTI (URINARY TRACT INFECTION): ICD-10-CM

## 2024-01-24 DIAGNOSIS — S00.01XA ABRASION OF SCALP, INITIAL ENCOUNTER: ICD-10-CM

## 2024-01-24 DIAGNOSIS — R55 SYNCOPE AND COLLAPSE: ICD-10-CM

## 2024-01-24 PROBLEM — H31.002 RETINAL SCAR OF LEFT EYE: Chronic | Status: ACTIVE | Noted: 2018-05-01

## 2024-01-24 PROBLEM — N28.1 BILATERAL RENAL CYSTS: Status: ACTIVE | Noted: 2020-01-21

## 2024-01-24 PROBLEM — F41.1 GENERALIZED ANXIETY DISORDER: Status: ACTIVE | Noted: 2020-01-21

## 2024-01-24 PROBLEM — I10 ESSENTIAL HYPERTENSION: Status: ACTIVE | Noted: 2021-07-06

## 2024-01-24 LAB
ALBUMIN SERPL BCG-MCNC: 4.3 G/DL (ref 3.5–5.2)
ALBUMIN UR-MCNC: NEGATIVE MG/DL
ALP SERPL-CCNC: 110 U/L (ref 40–150)
ALT SERPL W P-5'-P-CCNC: 21 U/L (ref 0–50)
ANION GAP SERPL CALCULATED.3IONS-SCNC: 8 MMOL/L (ref 7–15)
APPEARANCE UR: CLEAR
AST SERPL W P-5'-P-CCNC: 37 U/L (ref 0–45)
ATRIAL RATE - MUSE: 75 BPM
BASOPHILS # BLD AUTO: 0.1 10E3/UL (ref 0–0.2)
BASOPHILS NFR BLD AUTO: 1 %
BILIRUB DIRECT SERPL-MCNC: <0.2 MG/DL (ref 0–0.3)
BILIRUB SERPL-MCNC: 0.5 MG/DL
BILIRUB UR QL STRIP: NEGATIVE
BUN SERPL-MCNC: 19.1 MG/DL (ref 8–23)
CALCIUM SERPL-MCNC: 9.1 MG/DL (ref 8.8–10.2)
CHLORIDE SERPL-SCNC: 99 MMOL/L (ref 98–107)
COLOR UR AUTO: ABNORMAL
CREAT SERPL-MCNC: 0.8 MG/DL (ref 0.51–0.95)
DEPRECATED HCO3 PLAS-SCNC: 29 MMOL/L (ref 22–29)
DIASTOLIC BLOOD PRESSURE - MUSE: 95 MMHG
EGFRCR SERPLBLD CKD-EPI 2021: 74 ML/MIN/1.73M2
EOSINOPHIL # BLD AUTO: 0.1 10E3/UL (ref 0–0.7)
EOSINOPHIL NFR BLD AUTO: 0 %
ERYTHROCYTE [DISTWIDTH] IN BLOOD BY AUTOMATED COUNT: 12.9 % (ref 10–15)
GLUCOSE SERPL-MCNC: 145 MG/DL (ref 70–99)
GLUCOSE UR STRIP-MCNC: NEGATIVE MG/DL
HCT VFR BLD AUTO: 38.6 % (ref 35–47)
HGB BLD-MCNC: 13 G/DL (ref 11.7–15.7)
HGB UR QL STRIP: ABNORMAL
HOLD SPECIMEN: NORMAL
IMM GRANULOCYTES # BLD: 0 10E3/UL
IMM GRANULOCYTES NFR BLD: 0 %
INTERPRETATION ECG - MUSE: NORMAL
KETONES UR STRIP-MCNC: NEGATIVE MG/DL
LEUKOCYTE ESTERASE UR QL STRIP: ABNORMAL
LIPASE SERPL-CCNC: 46 U/L (ref 13–60)
LYMPHOCYTES # BLD AUTO: 1.3 10E3/UL (ref 0.8–5.3)
LYMPHOCYTES NFR BLD AUTO: 11 %
MCH RBC QN AUTO: 31.9 PG (ref 26.5–33)
MCHC RBC AUTO-ENTMCNC: 33.7 G/DL (ref 31.5–36.5)
MCV RBC AUTO: 95 FL (ref 78–100)
MONOCYTES # BLD AUTO: 0.7 10E3/UL (ref 0–1.3)
MONOCYTES NFR BLD AUTO: 6 %
NEUTROPHILS # BLD AUTO: 9.7 10E3/UL (ref 1.6–8.3)
NEUTROPHILS NFR BLD AUTO: 82 %
NITRATE UR QL: NEGATIVE
NRBC # BLD AUTO: 0 10E3/UL
NRBC BLD AUTO-RTO: 0 /100
P AXIS - MUSE: 64 DEGREES
PH UR STRIP: 7 [PH] (ref 5–7)
PLATELET # BLD AUTO: 255 10E3/UL (ref 150–450)
POTASSIUM SERPL-SCNC: 4.5 MMOL/L (ref 3.4–5.3)
PR INTERVAL - MUSE: 192 MS
PROT SERPL-MCNC: 8 G/DL (ref 6.4–8.3)
QRS DURATION - MUSE: 94 MS
QT - MUSE: 394 MS
QTC - MUSE: 439 MS
R AXIS - MUSE: -21 DEGREES
RBC # BLD AUTO: 4.07 10E6/UL (ref 3.8–5.2)
RBC URINE: 12 /HPF
SODIUM SERPL-SCNC: 136 MMOL/L (ref 135–145)
SP GR UR STRIP: <1.005 (ref 1–1.03)
SQUAMOUS EPITHELIAL: 4 /HPF
SYSTOLIC BLOOD PRESSURE - MUSE: 175 MMHG
T AXIS - MUSE: 30 DEGREES
TROPONIN T SERPL HS-MCNC: 7 NG/L
UROBILINOGEN UR STRIP-MCNC: <2 MG/DL
VENTRICULAR RATE- MUSE: 75 BPM
WBC # BLD AUTO: 11.8 10E3/UL (ref 4–11)
WBC URINE: 119 /HPF

## 2024-01-24 PROCEDURE — 82248 BILIRUBIN DIRECT: CPT

## 2024-01-24 PROCEDURE — 250N000011 HC RX IP 250 OP 636

## 2024-01-24 PROCEDURE — 83690 ASSAY OF LIPASE: CPT

## 2024-01-24 PROCEDURE — 99285 EMERGENCY DEPT VISIT HI MDM: CPT | Mod: 25

## 2024-01-24 PROCEDURE — 36415 COLL VENOUS BLD VENIPUNCTURE: CPT | Performed by: EMERGENCY MEDICINE

## 2024-01-24 PROCEDURE — 72125 CT NECK SPINE W/O DYE: CPT

## 2024-01-24 PROCEDURE — 90715 TDAP VACCINE 7 YRS/> IM: CPT

## 2024-01-24 PROCEDURE — 70450 CT HEAD/BRAIN W/O DYE: CPT

## 2024-01-24 PROCEDURE — 84484 ASSAY OF TROPONIN QUANT: CPT | Performed by: EMERGENCY MEDICINE

## 2024-01-24 PROCEDURE — 90471 IMMUNIZATION ADMIN: CPT

## 2024-01-24 PROCEDURE — 250N000009 HC RX 250

## 2024-01-24 PROCEDURE — 81001 URINALYSIS AUTO W/SCOPE: CPT

## 2024-01-24 PROCEDURE — 93005 ELECTROCARDIOGRAM TRACING: CPT | Performed by: EMERGENCY MEDICINE

## 2024-01-24 PROCEDURE — 74177 CT ABD & PELVIS W/CONTRAST: CPT

## 2024-01-24 PROCEDURE — 87186 SC STD MICRODIL/AGAR DIL: CPT

## 2024-01-24 PROCEDURE — 87086 URINE CULTURE/COLONY COUNT: CPT

## 2024-01-24 PROCEDURE — 85004 AUTOMATED DIFF WBC COUNT: CPT | Performed by: EMERGENCY MEDICINE

## 2024-01-24 PROCEDURE — 80048 BASIC METABOLIC PNL TOTAL CA: CPT | Performed by: EMERGENCY MEDICINE

## 2024-01-24 RX ORDER — CEPHALEXIN 500 MG/1
500 CAPSULE ORAL 4 TIMES DAILY
Qty: 28 CAPSULE | Refills: 0 | Status: SHIPPED | OUTPATIENT
Start: 2024-01-24 | End: 2024-01-31

## 2024-01-24 RX ORDER — GINSENG 100 MG
CAPSULE ORAL ONCE
Status: COMPLETED | OUTPATIENT
Start: 2024-01-24 | End: 2024-01-24

## 2024-01-24 RX ORDER — IOPAMIDOL 755 MG/ML
90 INJECTION, SOLUTION INTRAVASCULAR ONCE
Status: COMPLETED | OUTPATIENT
Start: 2024-01-24 | End: 2024-01-24

## 2024-01-24 RX ADMIN — BACITRACIN: 500 OINTMENT TOPICAL at 20:47

## 2024-01-24 RX ADMIN — CLOSTRIDIUM TETANI TOXOID ANTIGEN (FORMALDEHYDE INACTIVATED), CORYNEBACTERIUM DIPHTHERIAE TOXOID ANTIGEN (FORMALDEHYDE INACTIVATED), BORDETELLA PERTUSSIS TOXOID ANTIGEN (GLUTARALDEHYDE INACTIVATED), BORDETELLA PERTUSSIS FILAMENTOUS HEMAGGLUTININ ANTIGEN (FORMALDEHYDE INACTIVATED), BORDETELLA PERTUSSIS PERTACTIN ANTIGEN, AND BORDETELLA PERTUSSIS FIMBRIAE 2/3 ANTIGEN 0.5 ML: 5; 2; 2.5; 5; 3; 5 INJECTION, SUSPENSION INTRAMUSCULAR at 20:47

## 2024-01-24 RX ADMIN — IOPAMIDOL 90 ML: 755 INJECTION, SOLUTION INTRAVENOUS at 18:08

## 2024-01-24 ASSESSMENT — ENCOUNTER SYMPTOMS
FREQUENCY: 0
VOMITING: 0
HEADACHES: 1
DYSURIA: 0
NAUSEA: 0
DIARRHEA: 1
DIFFICULTY URINATING: 0
SHORTNESS OF BREATH: 0
HEMATURIA: 0
BLOOD IN STOOL: 0
PALPITATIONS: 0

## 2024-01-24 ASSESSMENT — ACTIVITIES OF DAILY LIVING (ADL): ADLS_ACUITY_SCORE: 35

## 2024-01-24 NOTE — ED TRIAGE NOTES
The patient presents to the ED after having a syncopal episode this afternoon while having a diarrhea bowel movement. The patient reports she has a history of diverticulosis and is worried she may have eaten something this morning to exacerbate that. She reports several episodes of diarrhea. Denies blood in her stool. The patient reports a history of defecation syncope. She did her her head when she fainted and has a bump to the left side of the head. Small amount of blood noted to the side of the head. She does not take blood thinners.

## 2024-01-24 NOTE — ED PROVIDER NOTES
EMERGENCY DEPARTMENT ENCOUNTER      NAME: Lennie Holden  AGE: 78 year old female  YOB: 1945  MRN: [id number]  EVALUATION DATE & TIME: No admission date for patient encounter.    PCP: Kalpesh Almaguer    ED PROVIDER: Beena Joiner PA-C      Chief Complaint   Patient presents with    Syncope         FINAL IMPRESSION:  1. Syncope and collapse    2. Colitis    3. Scalp hematoma, initial encounter    4. Abrasion of scalp, initial encounter    5. UTI (urinary tract infection)          ED COURSE & MEDICAL DECISION MAKIN:20 PM Met with patient for initial interview. Plan for care discussed.  7:18 PM I rechecked the patient and updated them on laboratory and imaging results. Abrasion irrigated and bacitracin applied. We discussed the plan for discharge and the patient is agreeable. Reviewed supportive cares, symptomatic treatment, outpatient follow up, and reasons to return to the Emergency Department. Patient to be discharged by ED RN.    78 year old female with a history of syncope and collapse, colitis, HTN presents to the Emergency Department for evaluation after syncopal event after episode of diarrhea after presumably eating dairy earlier this morning. Patient took a few steps after episode of diarrhea and had lost consciousness and hit head on sink. She was found by her  a few minutes later. No evidence of post-ictal period. No blood thinners, vomiting, or neurologic deficits noted. Per chart review, patient was evaluated in the ED with a near identical syncopal event on 2023, work-up revealed indeterminate mild colitis, otherwise negative for acute findings. Patient answers questions appropriately and exhibits no acute focal neurologic deficit. Upon exam, patient is afebrile and in no acute distress. Hematoma with superficial abrasion left occiput, which was irrigated and bacitracin ointment applied. LLQ abdominal tenderness to palpation without guarding, rebound, or  peritoneal signs. Differential diagnosis includes but not limited to intracranial hemorrhage, fracture, subluxation, cardiac arrhythmia, electrolyte abnormality, anemia, dehydration, vasovagal syncope, orthostatic hypotension, UTI, gastroenteritis, pancreatitis, IBS, colitis, diverticulitis. No history of seizures or evidence of tongue bite or post-ictal period. Low suspicion for PE as no provoking DVT/PE factors, no associated shortness of breath, pleuritic chest pain, hemoptysis, tachycardia, tachypnea or hypoxia. Based on patient's presenting symptoms, laboratories and imaging were ordered. Tetanus was updated in the ED.     CBC with mild leukocytosis at 11.8. BMP with mild hyperglycemia at 145, otherwise WNL. HFP WNL. Lipase WNL. CT with evidence of colitis descending and sigmoid colon, similar to previous scans. Per patient, she followed with [facility] outpatient and they thought it to be acute/viral and both antibiotics and steroids were deferred. Patient feels safe discharging home and following up with [facility] outpatient. Patient did not give urine sample while in the ED and using shared decision making, UA deferred as no urinary symptoms.     Patient declined analgesia upon arrival. Symptoms and workup most consistent with syncope after GI loss and acute colitis in setting of dairy intake. Patient was made aware of the above findings. Patient ambulating in ED without lightheadedness/dizziness and has not had recurrent diarrhea since prior to arrival. Patient feels safe discharging home and following up with [facility] outpatient. Plan to discharge patient home with symptomatic management, strict return precautions, and close follow up with their PCP and [facility] for reevaluation and ongoing management. The patient was stable and well appearing upon discharge. The patient was advised to return to the ER if any new or worsening symptoms develop. The patient verbalizes understanding and agrees with the plan.     Addendum:      8:50 PM Reevaluated and updated patient. Patient had been waiting for Tdap and formal discharge and ended up providing a urine sample after she had been placed for discharge/after our discussion. UA revealed findings concerning for infection, although few squamous cells that could be causing contamination, culture pending. Per chart review, I cannot see any recent urine cultures. Will plan for Keflex and adjust based on culture if needed.    Medical Decision Making  Obtained supplemental history:Supplemental history obtained?: No  Reviewed external records: External records reviewed?: Inpatient Record: Admission April 2023 for colitis and syncope  Care impacted by chronic illness:Hypertension  Care significantly affected by social determinants of health:N/A  Did you consider but not order tests?: Work up considered but not performed and documented in chart, if applicable  Did you interpret images independently?: Independent interpretation of ECG and images noted in documentation, when applicable.  Consultation discussion with other provider:Did you involve another provider (consultant, , pharmacy, etc.)?: No  Discharge. No recommendations on prescription strength medication(s). See documentation for any additional details.    MEDICATIONS GIVEN IN THE EMERGENCY:  Medications   Tdap (tetanus-diphtheria-acell pertussis) (ADACEL) injection 0.5 mL (0.5 mLs Intramuscular $Given 1/24/24 2047)   bacitracin ointment ( Topical $Given 1/24/24 2047)   iopamidol (ISOVUE-370) solution 90 mL (90 mLs Intravenous $Given 1/24/24 1808)       NEW PRESCRIPTIONS STARTED AT TODAY'S ER VISIT  Discharge Medication List as of 1/24/2024  9:02 PM        START taking these medications    Details   cephALEXin (KEFLEX) 500 MG capsule Take 1 capsule (500 mg) by mouth 4 times daily for 7 days, Disp-28 capsule, R-0, E-Prescribe                =================================================================    HPI    Patient information was  obtained from: Patient    Use of : N/A      Lennie Holden is a 78 year old female with a pertinent history of HTN, syncope, and colitis who presents to this ED by walk in for evaluation after a syncopal event.    Per chart review, the patient was admitted to [facility] from 4/8/23 to 4/9/23 for colitis demonstrated on CT. The patient reported a syncopal episode while having an episode of diarrhea. While admitted the patient had improvement with IV fluids and was able to tolerate oral intake. Patient was discharged to home with plan for outpatient follow-up with PCP.    The patient reports she and her  went out to breakfast this morning. She has sensitivity to lactose and thought she avoided any lactose containing products, but this afternoon started to have some abdominal cramping. She then had 3 episodes of diarrhea. After the diarrhea she got up to go lay down and after taking a few steps woke up on the floor with pain to the back of her head on the left side. She is unsure how long she was unconscious for. She is not anticoagulated. At time of ED evaluation she is no longer having any abdominal pain or diarrhea.    The patient reports a prior history of diverticulitis. She has not been hospitalized recently and has not been on any antibiotics. She denies any history of diarrheal illnesses. There is no one else at home with similar diarrhea.    She denies any bloody or black stool, nausea, vomiting, urinary symptoms, chest pain, shortness of breath, or palpitations. She denies any history of blood clots.    REVIEW OF SYSTEMS   Review of Systems   Respiratory:  Negative for shortness of breath.    Cardiovascular:  Negative for chest pain and palpitations.   Gastrointestinal:  Positive for diarrhea. Negative for blood in stool, nausea and vomiting.   Genitourinary:  Negative for difficulty urinating, dysuria, frequency, hematuria and urgency.   Neurological:  Positive for syncope and  "headaches.       PAST MEDICAL HISTORY:  History reviewed. No pertinent past medical history.    PAST SURGICAL HISTORY:  Past Surgical History:   Procedure Laterality Date    ABDOMEN SURGERY      BIOPSY SKIN (LOCATION)      face -    HYSTERECTOMY      TONSILLECTOMY             CURRENT MEDICATIONS:    cephALEXin (KEFLEX) 500 MG capsule  escitalopram (LEXAPRO) 10 MG tablet  levothyroxine (SYNTHROID, LEVOTHROID) 125 MCG tablet  lisinopril (PRINIVIL,ZESTRIL) 5 MG tablet        ALLERGIES:  Allergies   Allergen Reactions    Lactose Unknown     Intolerant    Penicillins Rash     Previously tolerated cephalosporins       FAMILY HISTORY:  History reviewed. No pertinent family history.    SOCIAL HISTORY:   Social History     Socioeconomic History    Marital status:    Tobacco Use    Smoking status: Former     Packs/day: 0     Types: Cigarettes     Quit date: 1979     Years since quittin.2    Smokeless tobacco: Never   Substance and Sexual Activity    Alcohol use: Yes     Alcohol/week: 1.0 standard drink of alcohol     Comment: Alcoholic Drinks/day: couple times a month    Drug use: Never    Sexual activity: Not Currently       VITALS:  BP (!) 153/89   Pulse 71   Temp 97.7  F (36.5  C) (Oral)   Resp 16   Ht 1.753 m (5' 9\")   Wt 95.3 kg (210 lb)   SpO2 99%   BMI 31.01 kg/m      PHYSICAL EXAM    Constitutional:  Alert, in no acute distress. Cooperative.  EYES: Conjunctivae clear. PERRL. EOM intact. Peripheral fields intact.  HENT:  Atraumatic, normocephalic. Oropharynx clear. Moist membranes. Tongue without deviation.  Respiratory:  Respirations even, unlabored, in no acute respiratory distress. Lungs clear to auscultation bilaterally without wheeze, rhonchi, or rales. No cough. Speaks in full sentences easily.  Cardiovascular:  Regular rate and rhythm, good peripheral perfusion. No peripheral edema. No chest wall tenderness.  GI: Soft, flat, non-distended. Bowel sounds normal. Mild LLQ tenderness to " palpation. No guarding, rebound, or other peritoneal signs.  Musculoskeletal:  No edema. No cyanosis. Range of motion major extremities intact.    Integument: Warm, Dry. No rash to visualized skin.   Neurologic:  Alert & oriented x4. No focal deficits noted. GCS 15. Sensation intact. Motor intact. Coordination intact. 5/5 strength. No midline CTLS spinal tenderness to palpation or obvious step off. Mild left paraspinal tenderness to palpation without overlying skin changes.  Psych: Normal mood and affect.      LAB:  All pertinent labs reviewed and interpreted.  Results for orders placed or performed during the hospital encounter of 01/24/24   CT Head w/o Contrast    Impression    IMPRESSION:  HEAD CT:  1.  No acute intracranial process.    CERVICAL SPINE CT:  1.  No CT evidence for acute fracture or post traumatic subluxation.   CT Cervical Spine w/o Contrast    Impression    IMPRESSION:  HEAD CT:  1.  No acute intracranial process.    CERVICAL SPINE CT:  1.  No CT evidence for acute fracture or post traumatic subluxation.   CT Abdomen Pelvis w Contrast    Impression    IMPRESSION:   1.  Descending colon and sigmoid heterogeneous colonic wall thickening compatible with colitis. Infectious or inflammatory etiologies are favored.  2.  Diverticulosis without CT evidence of diverticulitis.  3.  Moderate cystocele at rest.   Basic metabolic panel   Result Value Ref Range    Sodium 136 135 - 145 mmol/L    Potassium 4.5 3.4 - 5.3 mmol/L    Chloride 99 98 - 107 mmol/L    Carbon Dioxide (CO2) 29 22 - 29 mmol/L    Anion Gap 8 7 - 15 mmol/L    Urea Nitrogen 19.1 8.0 - 23.0 mg/dL    Creatinine 0.80 0.51 - 0.95 mg/dL    GFR Estimate 74 >60 mL/min/1.73m2    Calcium 9.1 8.8 - 10.2 mg/dL    Glucose 145 (H) 70 - 99 mg/dL   Result Value Ref Range    Troponin T, High Sensitivity 7 <=14 ng/L   CBC with platelets and differential   Result Value Ref Range    WBC Count 11.8 (H) 4.0 - 11.0 10e3/uL    RBC Count 4.07 3.80 - 5.20 10e6/uL     Hemoglobin 13.0 11.7 - 15.7 g/dL    Hematocrit 38.6 35.0 - 47.0 %    MCV 95 78 - 100 fL    MCH 31.9 26.5 - 33.0 pg    MCHC 33.7 31.5 - 36.5 g/dL    RDW 12.9 10.0 - 15.0 %    Platelet Count 255 150 - 450 10e3/uL    % Neutrophils 82 %    % Lymphocytes 11 %    % Monocytes 6 %    % Eosinophils 0 %    % Basophils 1 %    % Immature Granulocytes 0 %    NRBCs per 100 WBC 0 <1 /100    Absolute Neutrophils 9.7 (H) 1.6 - 8.3 10e3/uL    Absolute Lymphocytes 1.3 0.8 - 5.3 10e3/uL    Absolute Monocytes 0.7 0.0 - 1.3 10e3/uL    Absolute Eosinophils 0.1 0.0 - 0.7 10e3/uL    Absolute Basophils 0.1 0.0 - 0.2 10e3/uL    Absolute Immature Granulocytes 0.0 <=0.4 10e3/uL    Absolute NRBCs 0.0 10e3/uL   Extra Heparinized Syringe   Result Value Ref Range    Hold Specimen JI    Result Value Ref Range    Lipase 46 13 - 60 U/L   Hepatic function panel   Result Value Ref Range    Protein Total 8.0 6.4 - 8.3 g/dL    Albumin 4.3 3.5 - 5.2 g/dL    Bilirubin Total 0.5 <=1.2 mg/dL    Alkaline Phosphatase 110 40 - 150 U/L    AST 37 0 - 45 U/L    ALT 21 0 - 50 U/L    Bilirubin Direct <0.20 0.00 - 0.30 mg/dL   UA with Microscopic reflex to Culture    Specimen: Urine, Clean Catch   Result Value Ref Range    Color Urine Light Yellow Colorless, Straw, Light Yellow, Yellow    Appearance Urine Clear Clear    Glucose Urine Negative Negative mg/dL    Bilirubin Urine Negative Negative    Ketones Urine Negative Negative mg/dL    Specific Gravity Urine <1.005 (L) 1.005 - 1.030    Blood Urine 0.03 mg/dL (A) Negative    pH Urine 7.0 5.0 - 7.0    Protein Albumin Urine Negative Negative mg/dL    Urobilinogen Urine <2.0 <2.0 mg/dL    Nitrite Urine Negative Negative    Leukocyte Esterase Urine 500 Nasreen/uL (A) Negative    RBC Urine 12 (H) <=2 /HPF    WBC Urine 119 (H) <=5 /HPF    Squamous Epithelials Urine 4 (H) <=1 /HPF   ECG 12-Lead with MUSE   Result Value Ref Range    Systolic Blood Pressure 175 mmHg    Diastolic Blood Pressure 95 mmHg    Ventricular Rate 75 BPM     Atrial Rate 75 BPM    RI Interval 192 ms    QRS Duration 94 ms     ms    QTc 439 ms    P Axis 64 degrees    R AXIS -21 degrees    T Axis 30 degrees    Interpretation ECG       Sinus rhythm  Normal ECG  When compared with ECG of 08-APR-2023 20:39,  No significant change was found  Confirmed by SEE ED PROVIDER NOTE FOR, ECG INTERPRETATION ([id number]),  Mago Myers ([id number]) on 1/24/2024 4:51:49 PM     Urine Culture    Specimen: Urine, Clean Catch   Result Value Ref Range    Culture Culture in progress        RADIOLOGY:  Reviewed all pertinent imaging. Please see official radiology report.  CT Abdomen Pelvis w Contrast   Final Result   IMPRESSION:    1.  Descending colon and sigmoid heterogeneous colonic wall thickening compatible with colitis. Infectious or inflammatory etiologies are favored.   2.  Diverticulosis without CT evidence of diverticulitis.   3.  Moderate cystocele at rest.      CT Cervical Spine w/o Contrast   Final Result   IMPRESSION:   HEAD CT:   1.  No acute intracranial process.      CERVICAL SPINE CT:   1.  No CT evidence for acute fracture or post traumatic subluxation.      CT Head w/o Contrast   Final Result   IMPRESSION:   HEAD CT:   1.  No acute intracranial process.      CERVICAL SPINE CT:   1.  No CT evidence for acute fracture or post traumatic subluxation.          EKG:    Performed at: 16:25  Impression: Sinus rhythm. Normal ECG.  Rate: 75 bpm  Rhythm: Sinus  Axis: -21  RI Interval: 192 ms  QRS Interval: 94 ms  QTc Interval: 439 ms  ST Changes: None  Comparison: No change from previous ECG from 4/8/23.    Dr. Li and I have independently reviewed and interpreted the EKG(s) documented above.    I, Kamran Pineda, am serving as a scribe to document services personally performed by Beena Joiner PA-C based on my observation and the provider's statements to me. I, Beena Joiner PA-C, attest that Kamran Pineda is acting in a scribe capacity, has observed my performance of  the services and has documented them in accordance with my direction.    Beena Joiner PA-C  [facility] EMERGENCY ROOM  [address] [address] [phone]      Beena Joiner PA-C  01/25/24 [id number]

## 2024-01-25 NOTE — DISCHARGE INSTRUCTIONS
You were seen at the Emergency Department today for your head injury.  You had a thorough workup that included a physical exam and a CT scan of the head.  Your CT scan does not show any signs of bleeding in the brain.  Your CT showed colitis as discussed.     Please call [facility] to set up reevaluation and ongoing management.     Return to the ER if you develop any worsening headache, confusion, vomiting, dizziness, weakness/numbness/tingling of arms or legs, facial droop, speech difficulty, abdominal pain, fevers/chills, vomiting, diarrhea, or other new symptoms.    Take Care!  - eBena Joiner PA-C

## 2024-01-26 LAB — BACTERIA UR CULT: ABNORMAL

## 2024-01-27 ENCOUNTER — TELEPHONE (OUTPATIENT)
Dept: EMERGENCY MEDICINE | Facility: CLINIC | Age: 79
End: 2024-01-27
Payer: COMMERCIAL

## 2024-01-27 NOTE — TELEPHONE ENCOUNTER
[facility] Emergency Department/Urgent Care Lab result notification   [Positive for uti and bacteria is susceptible to antibiotic]    Reason for call:   Notify of lab results  Assess patient current symptoms  Review ED Providers recommendations/discharge instructions (if necessary)  Advise per [facility] lab result Urine Culture protocol    Lab Result & Date of Final Report [copied from Result Note]:    Final Urine Culture Report on 01/26/2024  [facility] Emergency Dept discharge antibiotic prescribed: Cephalexin (Keflex) 500 mg capsule, 1 capsule (500 mg) by mouth 4 times daily for 7 days.  #1. Bacteria, 50,000 - 100,000 CFU/ML Escherichia coli is SUSCEPTIBLE to Antibiotic.    No change in treatment per [facility] lab result Urine Culture protocol.    RN Assessment (Patient's current Symptoms):  Time of call: 4:42P  left message    Left voicemail message requesting a call back to [facility] ED Lab Result RN at [phone].  RN is available every day between 9 a.m. and 5:30 p.m.      Bret Orozco RN  [facility] [facility]  Emergency Dept Lab Result RN  Ph# [phone]

## 2024-09-29 ENCOUNTER — HEALTH MAINTENANCE LETTER (OUTPATIENT)
Age: 79
End: 2024-09-29